# Patient Record
Sex: FEMALE | Race: WHITE | URBAN - METROPOLITAN AREA
[De-identification: names, ages, dates, MRNs, and addresses within clinical notes are randomized per-mention and may not be internally consistent; named-entity substitution may affect disease eponyms.]

---

## 2017-10-18 PROBLEM — R55 SYNCOPE AND COLLAPSE: Status: ACTIVE | Noted: 2017-10-18

## 2017-10-23 ENCOUNTER — OFFICE VISIT (OUTPATIENT)
Dept: CARDIOLOGY CLINIC | Age: 67
End: 2017-10-23

## 2017-10-23 VITALS
SYSTOLIC BLOOD PRESSURE: 132 MMHG | HEART RATE: 84 BPM | DIASTOLIC BLOOD PRESSURE: 76 MMHG | HEIGHT: 65 IN | BODY MASS INDEX: 31.39 KG/M2 | WEIGHT: 188.4 LBS

## 2017-10-23 DIAGNOSIS — R55 SYNCOPE AND COLLAPSE: ICD-10-CM

## 2017-10-23 DIAGNOSIS — I49.9 VENTRICULAR ARRHYTHMIA: ICD-10-CM

## 2017-10-23 DIAGNOSIS — R55 SYNCOPE, UNSPECIFIED SYNCOPE TYPE: Primary | ICD-10-CM

## 2017-10-23 PROCEDURE — 99214 OFFICE O/P EST MOD 30 MIN: CPT | Performed by: INTERNAL MEDICINE

## 2017-10-23 PROCEDURE — 1111F DSCHRG MED/CURRENT MED MERGE: CPT | Performed by: INTERNAL MEDICINE

## 2017-10-23 PROCEDURE — 1036F TOBACCO NON-USER: CPT | Performed by: INTERNAL MEDICINE

## 2017-10-23 PROCEDURE — G8427 DOCREV CUR MEDS BY ELIG CLIN: HCPCS | Performed by: INTERNAL MEDICINE

## 2017-10-23 PROCEDURE — G8400 PT W/DXA NO RESULTS DOC: HCPCS | Performed by: INTERNAL MEDICINE

## 2017-10-23 PROCEDURE — 3014F SCREEN MAMMO DOC REV: CPT | Performed by: INTERNAL MEDICINE

## 2017-10-23 PROCEDURE — G8417 CALC BMI ABV UP PARAM F/U: HCPCS | Performed by: INTERNAL MEDICINE

## 2017-10-23 PROCEDURE — G8484 FLU IMMUNIZE NO ADMIN: HCPCS | Performed by: INTERNAL MEDICINE

## 2017-10-23 PROCEDURE — 3017F COLORECTAL CA SCREEN DOC REV: CPT | Performed by: INTERNAL MEDICINE

## 2017-10-23 PROCEDURE — 4040F PNEUMOC VAC/ADMIN/RCVD: CPT | Performed by: INTERNAL MEDICINE

## 2017-10-23 PROCEDURE — 1090F PRES/ABSN URINE INCON ASSESS: CPT | Performed by: INTERNAL MEDICINE

## 2017-10-23 PROCEDURE — 1123F ACP DISCUSS/DSCN MKR DOCD: CPT | Performed by: INTERNAL MEDICINE

## 2017-10-23 NOTE — PROGRESS NOTES
CARDIOLOGY  NOTE    Chief Complaint: Syncope /VT    HPI:   Wilfredo Whittington is a 79y.o. year old who has history as noted below. She is here for follow-up after recent hospitalization for syncope. After walking. She passed out in front of her house on the driveway on telemetry in hospital she was noted to have a short run of ventricular tachycardia. All workup in the hospital including a cardiac cath did not reveal any significant abnormality. She still feels a little lightheaded but she has a facial bruise. She wasn't phentermine to help her lose weight. She was advised to stop it       Current Outpatient Prescriptions   Medication Sig Dispense Refill    esomeprazole Magnesium (NEXIUM) 20 MG PACK Take 20 mg by mouth daily      fexofenadine (ALLEGRA ALLERGY) 180 MG tablet Take 180 mg by mouth daily      allopurinol (ZYLOPRIM) 100 MG tablet Take 100 mg by mouth daily      tolterodine (DETROL LA) 4 MG extended release capsule Take 4 mg by mouth daily      hydrOXYzine (ATARAX) 25 MG tablet Take 25 mg by mouth 3 times daily as needed for Itching      linaclotide (LINZESS) 290 MCG CAPS capsule Take 290 mcg by mouth every morning (before breakfast)      losartan (COZAAR) 100 MG tablet Take 100 mg by mouth daily      mometasone (NASONEX) 50 MCG/ACT nasal spray 2 sprays by Nasal route daily      montelukast (SINGULAIR) 10 MG tablet Take 10 mg by mouth nightly      traZODone (DESYREL) 150 MG tablet Take 150 mg by mouth nightly      sertraline (ZOLOFT) 100 MG tablet Take 100 mg by mouth daily       No current facility-administered medications for this visit. Allergies:   Review of patient's allergies indicates no known allergies.     Patient History:  Past Medical History:   Diagnosis Date    Depression     Gout     Hypertension      Past Surgical History:   Procedure Laterality Date    BACK SURGERY      CHOLECYSTECTOMY      EYE SURGERY      FOOT SURGERY Bilateral  HYSTERECTOMY      JOINT REPLACEMENT       History reviewed. No pertinent family history. Social History   Substance Use Topics    Smoking status: Former Smoker    Smokeless tobacco: Never Used    Alcohol use Yes      Comment: occasional        Review of Systems:   · Constitutional: No Fever or Weight Loss   · Eyes: No Decreased Vision  · ENT: No Headaches, Hearing Loss or Vertigo  · Cardiovascular: as per note above   · Respiratory: No cough or wheezing and as per note above. · Gastrointestinal: No abdominal pain, appetite loss, blood in stools, constipation, diarrhea or heartburn  · Genitourinary: No dysuria, trouble voiding, or hematuria  · Musculoskeletal:  None  · Integumentary: No rash or pruritis  · Neurological: No TIA or stroke symptoms  · Psychiatric: No anxiety or depression  · Endocrine: No malaise, fatigue or temperature intolerance  · Hematologic/Lymphatic: No bleeding problems, blood clots or swollen lymph nodes  · Allergic/Immunologic: No nasal congestion or hives    Objective:      Physical Exam:  /76   Pulse 84   Ht 5' 5\" (1.651 m)   Wt 188 lb 6.4 oz (85.5 kg)   BMI 31.35 kg/m²   Wt Readings from Last 3 Encounters:   10/23/17 188 lb 6.4 oz (85.5 kg)   10/19/17 189 lb 9.6 oz (86 kg)     Body mass index is 31.35 kg/m². Vitals:    10/23/17 1443   BP: 132/76   Pulse: 84        General Appearance:  No distress, conversant  Constitutional:  Well developed, Well nourished, No acute distress, Non-toxic appearance. HENT:  Normocephalic, Atraumatic, Bilateral external ears normal, Oropharynx moist, No oral exudates, Nose normal. Neck- Normal range of motion, No tenderness, Supple, No stridor,no apical-carotid delay  Eyes:  PERRL, EOMI, Conjunctiva normal, No discharge. Respiratory:  Normal breath sounds, No respiratory distress, No wheezing, No chest tenderness. ,no use of accessory muscles,   Cardiovascular: (PMI) apex non displaced,no lifts no thrills,S1 and S2 audible, No added heart sounds, No signs of ankle edema, or volume overload, No evidence of JVD  GI:  Bowel sounds normal, Soft, No tenderness, No masses, No gross visceromegaly   :  No costovertebral angle tenderness   Musculoskeletal:  No edema, no tenderness, no deformities. Back- no tenderness  Integument:  Well hydrated, no rash   Lymphatic:  No lymphadenopathy noted   Neurologic:  Alert & oriented x 3, CN 2-12 normal, normal motor function, normal sensory function, no focal deficits noted   Psychiatric:  Speech and behavior appropriate       Medical decision making and Data review:  DATA:  Lab Results   Component Value Date    TROPONINT <0.010 10/19/2017     BNP:  No results found for: PROBNP  PT/INR:  No results found for: PTINR  No results found for: LABA1C  Lab Results   Component Value Date    CHOL 124 10/19/2017    TRIG 88 10/19/2017    HDL 55 10/19/2017    LDLDIRECT 61 10/19/2017     Lab Results   Component Value Date    ALT 12 10/18/2017    AST 16 10/18/2017     TSH: No results found for: TSH  Summary    No ischemic ECG changes with Lexiscan infusion.    This is a normal study.    No infarct or ischemia noted.    Decreased uptake inferiorly due to diaphragmatic artifact.    Normal EF 69 % with normal ventricular contractility.         Cath 10-9/17  Procedure Summary   radial access   No significant CAD   LVEDP was 9 mmHG   LAD, Circ and RCA are patent     Angiographic Findings    Diagnostic Findings    Cariac Arteries and Lesion Findings   LMCA: Normal (no stenosis %). LAD: Normal (no stenosis %). LCx: Normal (no stenosis %). RCA: Normal (no stenosis %). All labs, medications and tests reviewed by myself including data and history from outside source , patient and available family . Assessment & Plan:      1. Syncope, unspecified syncope type    2. Ventricular arrhythmia    3. Syncope and collapse         Syncope and collapse  All workup including cardiac cath shows no significant abnormality.   She did have

## 2017-10-23 NOTE — ASSESSMENT & PLAN NOTE
All workup including cardiac cath shows no significant abnormality. She did have episode of ventricular tachycardia, all when she was in the hospital..  She is advised to hold phentermine. We will discuss with Dr. Aileen Jones  about possible loop. If she has more events. She was awaiting a 48-hour Holter monitor, results of  which is pending

## 2017-10-27 ENCOUNTER — PROCEDURE VISIT (OUTPATIENT)
Dept: CARDIOLOGY CLINIC | Age: 67
End: 2017-10-27

## 2017-10-27 DIAGNOSIS — R55 SYNCOPE, UNSPECIFIED SYNCOPE TYPE: ICD-10-CM

## 2017-10-27 PROCEDURE — 93015 CV STRESS TEST SUPVJ I&R: CPT | Performed by: INTERNAL MEDICINE

## 2023-05-17 ENCOUNTER — HOSPITAL ENCOUNTER (OUTPATIENT)
Dept: SPEECH THERAPY | Age: 73
Setting detail: THERAPIES SERIES
Discharge: HOME | End: 2023-05-17
Payer: MEDICARE

## 2023-05-17 PROCEDURE — 92507 TX SP LANG VOICE COMM INDIV: CPT

## 2023-05-17 PROCEDURE — 92524 BEHAVRAL QUALIT ANALYS VOICE: CPT

## 2023-05-17 PROCEDURE — 31579 LARYNGOSCOPY TELESCOPIC: CPT

## 2023-05-17 NOTE — PROGRESS NOTES
[][place] [address]      [facility] [address]mirthaLouis Stokes [facility], [address], [address] [facility],[address]       Ivette Bauer [address]     [phone] [phone] [phone]  [x][facility] Hospital[facility]jeet [facility]r [address]           Outpatient Speech Dept. [address] [facility], [address] [facility],Third [address]           [phone] [phone]    PERCEPTUAL VOICE EVALUATION WITH VIDEOLARYNGOSTROBOSCOPY AND CARE PLAN    Patient:  Myla Chatterjee Date of Evaluation:  2023   Age: 67 y.o. :  1950   Medical Diagnosis:  Dysphonia [R49.0]           Treatment Dx:  Dysphagia R49.0       Referring Physician: ELSA Quevedo     Onset Date: Several years per patient report    Clinician:  Radha Bates MS, CCC-SLP                           HISTORY:    Voice History:    Myla Chatterjee presents today with history of onset of dysphonia over the last several years. She reports that her vocal quality worsened following laryngitis 2-3 mos ago, however is now back to baseline hoarseness. Pt endorses frequent throat clearing d/t sinus drainage and thick mucous management. She reports that was recently found to have two nodules on her thyroid and is waiting for a follow up appointment to have the nodules biopsied. Ms. Kyree Sarmiento is currently retired, however reports frequent voice use at home communicating with her  who is [facility]. She  reports occasional globus sensation and xerostomia with solid foods; denying any coughing/choking with meals.      He/she reports the following complaints/symptoms:   Vocal fatigue: occasionally   Vocal straining: occasionally   Pitch breaks: denies   Running out of air when talking: denies   Daily fluctuations in voice: yes  Throat clearing/coughing: yes   Pharyngeal globus sensation: yes   Dysphagia: yes- d/t globus sensation and xerostomia   Difficulty

## 2023-05-18 NOTE — FLOWSHEET NOTE
Patients Plan of Care was received and signed. Signed POC was scanned and placed in the patients chart.     Jesusita Fuentes

## 2025-07-18 RX ORDER — PANTOPRAZOLE SODIUM 40 MG/1
40 TABLET, DELAYED RELEASE ORAL
COMMUNITY

## 2025-07-18 RX ORDER — MIRABEGRON 50 MG/1
50 TABLET, FILM COATED, EXTENDED RELEASE ORAL DAILY
COMMUNITY

## 2025-07-18 RX ORDER — CLOBETASOL PROPIONATE 0.5 MG/G
CREAM TOPICAL
COMMUNITY

## 2025-07-18 RX ORDER — AMLODIPINE BESYLATE 5 MG/1
5 TABLET ORAL
COMMUNITY

## 2025-07-18 RX ORDER — BUPROPION HYDROCHLORIDE 150 MG/1
150 TABLET ORAL DAILY
COMMUNITY